# Patient Record
Sex: MALE | Race: WHITE | NOT HISPANIC OR LATINO | Employment: OTHER | URBAN - METROPOLITAN AREA
[De-identification: names, ages, dates, MRNs, and addresses within clinical notes are randomized per-mention and may not be internally consistent; named-entity substitution may affect disease eponyms.]

---

## 2024-12-04 ENCOUNTER — OFFICE VISIT (OUTPATIENT)
Dept: PRIMARY CARE | Facility: CLINIC | Age: 69
End: 2024-12-04
Payer: COMMERCIAL

## 2024-12-04 VITALS
HEIGHT: 67 IN | BODY MASS INDEX: 41.59 KG/M2 | SYSTOLIC BLOOD PRESSURE: 130 MMHG | DIASTOLIC BLOOD PRESSURE: 80 MMHG | WEIGHT: 265 LBS | HEART RATE: 82 BPM | OXYGEN SATURATION: 97 %

## 2024-12-04 DIAGNOSIS — E55.9 VITAMIN D DEFICIENCY: ICD-10-CM

## 2024-12-04 DIAGNOSIS — I10 ESSENTIAL (PRIMARY) HYPERTENSION: ICD-10-CM

## 2024-12-04 DIAGNOSIS — E78.2 HYPERLIPEMIA, MIXED: ICD-10-CM

## 2024-12-04 DIAGNOSIS — E11.9 TYPE 2 DIABETES MELLITUS WITHOUT COMPLICATION, WITHOUT LONG-TERM CURRENT USE OF INSULIN (MULTI): Primary | Chronic | ICD-10-CM

## 2024-12-04 DIAGNOSIS — Z12.5 SCREENING PSA (PROSTATE SPECIFIC ANTIGEN): ICD-10-CM

## 2024-12-04 PROCEDURE — 3008F BODY MASS INDEX DOCD: CPT | Performed by: FAMILY MEDICINE

## 2024-12-04 PROCEDURE — 1126F AMNT PAIN NOTED NONE PRSNT: CPT | Performed by: FAMILY MEDICINE

## 2024-12-04 PROCEDURE — 3075F SYST BP GE 130 - 139MM HG: CPT | Performed by: FAMILY MEDICINE

## 2024-12-04 PROCEDURE — 1123F ACP DISCUSS/DSCN MKR DOCD: CPT | Performed by: FAMILY MEDICINE

## 2024-12-04 PROCEDURE — 3079F DIAST BP 80-89 MM HG: CPT | Performed by: FAMILY MEDICINE

## 2024-12-04 PROCEDURE — 99213 OFFICE O/P EST LOW 20 MIN: CPT | Performed by: FAMILY MEDICINE

## 2024-12-04 PROCEDURE — 1159F MED LIST DOCD IN RCRD: CPT | Performed by: FAMILY MEDICINE

## 2024-12-04 PROCEDURE — 1036F TOBACCO NON-USER: CPT | Performed by: FAMILY MEDICINE

## 2024-12-04 PROCEDURE — 99203 OFFICE O/P NEW LOW 30 MIN: CPT | Performed by: FAMILY MEDICINE

## 2024-12-04 RX ORDER — ASPIRIN 81 MG/1
81 TABLET ORAL
COMMUNITY

## 2024-12-04 RX ORDER — LISINOPRIL AND HYDROCHLOROTHIAZIDE 10; 12.5 MG/1; MG/1
1 TABLET ORAL DAILY
COMMUNITY

## 2024-12-04 RX ORDER — ACETAMINOPHEN 500 MG
TABLET ORAL
COMMUNITY

## 2024-12-04 ASSESSMENT — ENCOUNTER SYMPTOMS
SHORTNESS OF BREATH: 0
PALPITATIONS: 0
LOSS OF SENSATION IN FEET: 0
CHEST TIGHTNESS: 0
POLYDIPSIA: 0
OCCASIONAL FEELINGS OF UNSTEADINESS: 0
DEPRESSION: 0

## 2024-12-04 ASSESSMENT — PATIENT HEALTH QUESTIONNAIRE - PHQ9
2. FEELING DOWN, DEPRESSED OR HOPELESS: NOT AT ALL
1. LITTLE INTEREST OR PLEASURE IN DOING THINGS: NOT AT ALL
SUM OF ALL RESPONSES TO PHQ9 QUESTIONS 1 AND 2: 0

## 2024-12-04 ASSESSMENT — COLUMBIA-SUICIDE SEVERITY RATING SCALE - C-SSRS
1. IN THE PAST MONTH, HAVE YOU WISHED YOU WERE DEAD OR WISHED YOU COULD GO TO SLEEP AND NOT WAKE UP?: NO
2. HAVE YOU ACTUALLY HAD ANY THOUGHTS OF KILLING YOURSELF?: NO
6. HAVE YOU EVER DONE ANYTHING, STARTED TO DO ANYTHING, OR PREPARED TO DO ANYTHING TO END YOUR LIFE?: NO

## 2024-12-04 ASSESSMENT — PAIN SCALES - GENERAL: PAINLEVEL_OUTOF10: 0-NO PAIN

## 2024-12-04 NOTE — PROGRESS NOTES
"[facility]: [facility]  E/M EVALUATION    Chalino Villalta \"Chito\" is a 69 y.o. male who presents for Establish Care.    Subjective   DM -dx 10/23.  No testing since    Htn- controlled.  Does not check routinely.      Mixed hyperlipidemia-  due for labs      Social -no tobacco.      Vitamin d3 deficiency, doesn't take meds all the time.       Review of Systems   Respiratory:  Negative for chest tightness and shortness of breath.    Cardiovascular:  Negative for chest pain, palpitations and leg swelling.   Endocrine: Negative for polydipsia and polyuria.       Objective   Vitals:    12/04/24 1006   BP: 130/80   Pulse: 82   SpO2: 97%     Physical Exam  Constitutional:       Appearance: Normal appearance.   Cardiovascular:      Rate and Rhythm: Normal rate and regular rhythm.      Heart sounds: No murmur heard.  Pulmonary:      Effort: Pulmonary effort is normal.      Breath sounds: Normal breath sounds.   Neurological:      Mental Status: He is alert.       No results found for: \"CHOL\", \"TRIG\", \"HDL\", \"LDLCALC\", \"CHHDL\", \"GLUCOSE\", \"CREATINE\", \"NA\", \"K\", \"ALT\", \"EGFR\", \"INR\", \"HGBA1C\", \"TSH\"    Assessment/Plan      Patient Active Problem List   Diagnosis    Type 2 diabetes mellitus without complications (Multi)    Essential (primary) hypertension       Diagnoses and all orders for this visit:  Type 2 diabetes mellitus without complication, without long-term current use of insulin (Multi)  -     Comprehensive Metabolic Panel; Future  -     Hemoglobin A1C; Future  -     Lipid Panel; Future  -     Albumin-Creatinine Ratio, Urine Random; Future  Essential (primary) hypertension  Screening PSA (prostate specific antigen)  -     Prostate Specific Antigen, Screen; Future  Hyperlipemia, mixed  Vitamin D deficiency  -     Vitamin D 25-Hydroxy,Total (for eval of Vitamin D levels); Future      The patient was encouraged to ensure that any/all documentation is accurate and up to date, and that our office be " provided a copy in the event that anything changes.         Bakari Cochran MD

## 2024-12-06 ENCOUNTER — LAB (OUTPATIENT)
Dept: LAB | Facility: LAB | Age: 69
End: 2024-12-06
Payer: COMMERCIAL

## 2024-12-06 DIAGNOSIS — E55.9 VITAMIN D DEFICIENCY: ICD-10-CM

## 2024-12-06 DIAGNOSIS — Z12.5 SCREENING PSA (PROSTATE SPECIFIC ANTIGEN): ICD-10-CM

## 2024-12-06 DIAGNOSIS — E11.9 TYPE 2 DIABETES MELLITUS WITHOUT COMPLICATION, WITHOUT LONG-TERM CURRENT USE OF INSULIN (MULTI): Chronic | ICD-10-CM

## 2024-12-06 LAB
EST. AVERAGE GLUCOSE BLD GHB EST-MCNC: 143 MG/DL
HBA1C MFR BLD: 6.6 %

## 2024-12-06 PROCEDURE — 36415 COLL VENOUS BLD VENIPUNCTURE: CPT

## 2024-12-06 PROCEDURE — G0103 PSA SCREENING: HCPCS

## 2024-12-06 PROCEDURE — 83036 HEMOGLOBIN GLYCOSYLATED A1C: CPT

## 2024-12-06 PROCEDURE — 82306 VITAMIN D 25 HYDROXY: CPT

## 2024-12-06 PROCEDURE — 80053 COMPREHEN METABOLIC PANEL: CPT

## 2024-12-06 PROCEDURE — 80061 LIPID PANEL: CPT

## 2024-12-07 LAB
25(OH)D3 SERPL-MCNC: 39 NG/ML (ref 30–100)
ALBUMIN SERPL BCP-MCNC: 3.9 G/DL (ref 3.4–5)
ALP SERPL-CCNC: 44 U/L (ref 33–136)
ALT SERPL W P-5'-P-CCNC: 40 U/L (ref 10–52)
ANION GAP SERPL CALC-SCNC: 12 MMOL/L (ref 10–20)
AST SERPL W P-5'-P-CCNC: 35 U/L (ref 9–39)
BILIRUB SERPL-MCNC: 0.6 MG/DL (ref 0–1.2)
BUN SERPL-MCNC: 16 MG/DL (ref 6–23)
CALCIUM SERPL-MCNC: 9 MG/DL (ref 8.6–10.6)
CHLORIDE SERPL-SCNC: 103 MMOL/L (ref 98–107)
CHOLEST SERPL-MCNC: 206 MG/DL (ref 0–199)
CHOLESTEROL/HDL RATIO: 3
CO2 SERPL-SCNC: 29 MMOL/L (ref 21–32)
CREAT SERPL-MCNC: 0.99 MG/DL (ref 0.5–1.3)
EGFRCR SERPLBLD CKD-EPI 2021: 83 ML/MIN/1.73M*2
GLUCOSE SERPL-MCNC: 140 MG/DL (ref 74–99)
HDLC SERPL-MCNC: 69.6 MG/DL
LDLC SERPL CALC-MCNC: 118 MG/DL
NON HDL CHOLESTEROL: 136 MG/DL (ref 0–149)
POTASSIUM SERPL-SCNC: 4.5 MMOL/L (ref 3.5–5.3)
PROT SERPL-MCNC: 6.6 G/DL (ref 6.4–8.2)
PSA SERPL-MCNC: 1.21 NG/ML
SODIUM SERPL-SCNC: 139 MMOL/L (ref 136–145)
TRIGL SERPL-MCNC: 94 MG/DL (ref 0–149)
VLDL: 19 MG/DL (ref 0–40)

## 2024-12-09 DIAGNOSIS — E11.9 TYPE 2 DIABETES MELLITUS WITHOUT COMPLICATION, WITHOUT LONG-TERM CURRENT USE OF INSULIN (MULTI): Primary | ICD-10-CM

## 2024-12-09 RX ORDER — ROSUVASTATIN CALCIUM 5 MG/1
5 TABLET, COATED ORAL DAILY
Qty: 100 TABLET | Refills: 3 | Status: SHIPPED | OUTPATIENT
Start: 2024-12-09 | End: 2026-01-13

## 2025-01-08 DIAGNOSIS — I10 ESSENTIAL (PRIMARY) HYPERTENSION: Primary | ICD-10-CM

## 2025-01-09 RX ORDER — LISINOPRIL AND HYDROCHLOROTHIAZIDE 10; 12.5 MG/1; MG/1
1 TABLET ORAL DAILY
Qty: 30 TABLET | Refills: 0 | Status: SHIPPED | OUTPATIENT
Start: 2025-01-09 | End: 2025-01-10 | Stop reason: SDUPTHER

## 2025-01-10 DIAGNOSIS — I10 ESSENTIAL (PRIMARY) HYPERTENSION: ICD-10-CM

## 2025-01-10 RX ORDER — LISINOPRIL AND HYDROCHLOROTHIAZIDE 10; 12.5 MG/1; MG/1
1 TABLET ORAL DAILY
Qty: 30 TABLET | Refills: 0 | Status: SHIPPED | OUTPATIENT
Start: 2025-01-10 | End: 2025-02-09

## 2025-01-10 RX ORDER — LISINOPRIL AND HYDROCHLOROTHIAZIDE 10; 12.5 MG/1; MG/1
1 TABLET ORAL DAILY
Qty: 30 TABLET | Refills: 0 | OUTPATIENT
Start: 2025-01-10 | End: 2025-02-09

## 2025-01-10 NOTE — TELEPHONE ENCOUNTER
Pt requesting to resend medication to [facility] instead - updated in chart - previous pharmacy was being remodeled

## 2025-01-31 DIAGNOSIS — I10 ESSENTIAL (PRIMARY) HYPERTENSION: ICD-10-CM

## 2025-02-04 RX ORDER — LISINOPRIL AND HYDROCHLOROTHIAZIDE 10; 12.5 MG/1; MG/1
1 TABLET ORAL DAILY
Qty: 30 TABLET | Refills: 0 | Status: SHIPPED | OUTPATIENT
Start: 2025-02-04 | End: 2025-03-06

## 2025-03-03 DIAGNOSIS — I10 ESSENTIAL (PRIMARY) HYPERTENSION: ICD-10-CM

## 2025-03-04 RX ORDER — LISINOPRIL AND HYDROCHLOROTHIAZIDE 10; 12.5 MG/1; MG/1
1 TABLET ORAL DAILY
Qty: 90 TABLET | Refills: 0 | Status: SHIPPED | OUTPATIENT
Start: 2025-03-04 | End: 2025-06-02

## 2025-03-05 NOTE — TELEPHONE ENCOUNTER
Spoke with pt he will look at his calendar and go online and schedule an appointment to establish care.

## 2025-03-17 PROBLEM — E78.00 PURE HYPERCHOLESTEROLEMIA, UNSPECIFIED: Status: ACTIVE | Noted: 2021-08-23

## 2025-03-17 PROBLEM — E55.9 VITAMIN D DEFICIENCY: Status: ACTIVE | Noted: 2021-08-23

## 2025-03-17 PROBLEM — K64.9 UNSPECIFIED HEMORRHOIDS: Status: ACTIVE | Noted: 2021-08-23

## 2025-03-17 PROBLEM — S61.011A LACERATION OF RIGHT THUMB, INITIAL ENCOUNTER: Status: ACTIVE | Noted: 2025-03-17

## 2025-03-17 PROBLEM — E66.9 OBESITY (BMI 30-39.9): Status: ACTIVE | Noted: 2021-10-12

## 2025-03-17 PROBLEM — M54.12 CERVICAL RADICULOPATHY: Status: ACTIVE | Noted: 2021-10-12

## 2025-03-18 ENCOUNTER — OFFICE VISIT (OUTPATIENT)
Dept: PRIMARY CARE | Facility: CLINIC | Age: 70
End: 2025-03-18
Payer: MEDICARE

## 2025-03-18 VITALS
OXYGEN SATURATION: 96 % | HEART RATE: 87 BPM | BODY MASS INDEX: 42.22 KG/M2 | HEIGHT: 67 IN | WEIGHT: 269 LBS | DIASTOLIC BLOOD PRESSURE: 76 MMHG | SYSTOLIC BLOOD PRESSURE: 136 MMHG

## 2025-03-18 DIAGNOSIS — I10 ESSENTIAL (PRIMARY) HYPERTENSION: ICD-10-CM

## 2025-03-18 DIAGNOSIS — E11.9 TYPE 2 DIABETES MELLITUS WITHOUT COMPLICATION, WITHOUT LONG-TERM CURRENT USE OF INSULIN (MULTI): Chronic | ICD-10-CM

## 2025-03-18 DIAGNOSIS — R06.83 SNORING: ICD-10-CM

## 2025-03-18 DIAGNOSIS — Z00.00 MEDICARE ANNUAL WELLNESS VISIT, SUBSEQUENT: ICD-10-CM

## 2025-03-18 DIAGNOSIS — E78.2 MIXED HYPERLIPIDEMIA: Primary | ICD-10-CM

## 2025-03-18 DIAGNOSIS — Z00.00 WELLNESS EXAMINATION: ICD-10-CM

## 2025-03-18 DIAGNOSIS — E66.01 CLASS 3 SEVERE OBESITY DUE TO EXCESS CALORIES WITH SERIOUS COMORBIDITY AND BODY MASS INDEX (BMI) OF 40.0 TO 44.9 IN ADULT: ICD-10-CM

## 2025-03-18 DIAGNOSIS — Z13.6 SCREENING FOR AAA (ABDOMINAL AORTIC ANEURYSM): ICD-10-CM

## 2025-03-18 DIAGNOSIS — E66.813 CLASS 3 SEVERE OBESITY DUE TO EXCESS CALORIES WITH SERIOUS COMORBIDITY AND BODY MASS INDEX (BMI) OF 40.0 TO 44.9 IN ADULT: ICD-10-CM

## 2025-03-18 PROCEDURE — 99397 PER PM REEVAL EST PAT 65+ YR: CPT | Performed by: FAMILY MEDICINE

## 2025-03-18 PROCEDURE — G0446 INTENS BEHAVE THER CARDIO DX: HCPCS | Performed by: FAMILY MEDICINE

## 2025-03-18 PROCEDURE — G0444 DEPRESSION SCREEN ANNUAL: HCPCS | Performed by: FAMILY MEDICINE

## 2025-03-18 PROCEDURE — 3075F SYST BP GE 130 - 139MM HG: CPT | Performed by: FAMILY MEDICINE

## 2025-03-18 PROCEDURE — 3078F DIAST BP <80 MM HG: CPT | Performed by: FAMILY MEDICINE

## 2025-03-18 PROCEDURE — 1126F AMNT PAIN NOTED NONE PRSNT: CPT | Performed by: FAMILY MEDICINE

## 2025-03-18 PROCEDURE — G2211 COMPLEX E/M VISIT ADD ON: HCPCS | Performed by: FAMILY MEDICINE

## 2025-03-18 PROCEDURE — G0439 PPPS, SUBSEQ VISIT: HCPCS | Performed by: FAMILY MEDICINE

## 2025-03-18 PROCEDURE — 99397 PER PM REEVAL EST PAT 65+ YR: CPT | Mod: CSA | Performed by: FAMILY MEDICINE

## 2025-03-18 PROCEDURE — 3008F BODY MASS INDEX DOCD: CPT | Performed by: FAMILY MEDICINE

## 2025-03-18 PROCEDURE — 99214 OFFICE O/P EST MOD 30 MIN: CPT | Performed by: FAMILY MEDICINE

## 2025-03-18 PROCEDURE — 1159F MED LIST DOCD IN RCRD: CPT | Performed by: FAMILY MEDICINE

## 2025-03-18 PROCEDURE — 1170F FXNL STATUS ASSESSED: CPT | Performed by: FAMILY MEDICINE

## 2025-03-18 PROCEDURE — G0447 BEHAVIOR COUNSEL OBESITY 15M: HCPCS | Performed by: FAMILY MEDICINE

## 2025-03-18 PROCEDURE — 99215 OFFICE O/P EST HI 40 MIN: CPT | Performed by: FAMILY MEDICINE

## 2025-03-18 PROCEDURE — 99214 OFFICE O/P EST MOD 30 MIN: CPT | Mod: 25 | Performed by: FAMILY MEDICINE

## 2025-03-18 PROCEDURE — 1124F ACP DISCUSS-NO DSCNMKR DOCD: CPT | Performed by: FAMILY MEDICINE

## 2025-03-18 PROCEDURE — 1036F TOBACCO NON-USER: CPT | Performed by: FAMILY MEDICINE

## 2025-03-18 RX ORDER — LISINOPRIL AND HYDROCHLOROTHIAZIDE 10; 12.5 MG/1; MG/1
1 TABLET ORAL DAILY
Qty: 90 TABLET | Refills: 0 | Status: SHIPPED | OUTPATIENT
Start: 2025-03-18 | End: 2025-06-16

## 2025-03-18 RX ORDER — ASPIRIN 81 MG/1
81 TABLET ORAL DAILY
Qty: 90 TABLET | Refills: 1 | Status: SHIPPED | OUTPATIENT
Start: 2025-03-18 | End: 2025-09-14

## 2025-03-18 ASSESSMENT — PATIENT HEALTH QUESTIONNAIRE - PHQ9
SUM OF ALL RESPONSES TO PHQ9 QUESTIONS 1 AND 2: 0
SUM OF ALL RESPONSES TO PHQ9 QUESTIONS 1 AND 2: 0
1. LITTLE INTEREST OR PLEASURE IN DOING THINGS: NOT AT ALL
2. FEELING DOWN, DEPRESSED OR HOPELESS: NOT AT ALL
2. FEELING DOWN, DEPRESSED OR HOPELESS: NOT AT ALL
1. LITTLE INTEREST OR PLEASURE IN DOING THINGS: NOT AT ALL

## 2025-03-18 ASSESSMENT — ACTIVITIES OF DAILY LIVING (ADL)
MANAGING_FINANCES: INDEPENDENT
DRESSING: INDEPENDENT
GROCERY_SHOPPING: INDEPENDENT
BATHING: INDEPENDENT
TAKING_MEDICATION: INDEPENDENT
DOING_HOUSEWORK: INDEPENDENT

## 2025-03-18 ASSESSMENT — ENCOUNTER SYMPTOMS
OCCASIONAL FEELINGS OF UNSTEADINESS: 0
LOSS OF SENSATION IN FEET: 0
DEPRESSION: 0

## 2025-03-18 ASSESSMENT — COGNITIVE AND FUNCTIONAL STATUS - GENERAL: VERBAL FLUENCY - ANIMAL NAMES (0 TO 25): 3

## 2025-03-18 ASSESSMENT — COLUMBIA-SUICIDE SEVERITY RATING SCALE - C-SSRS
6. HAVE YOU EVER DONE ANYTHING, STARTED TO DO ANYTHING, OR PREPARED TO DO ANYTHING TO END YOUR LIFE?: NO
1. IN THE PAST MONTH, HAVE YOU WISHED YOU WERE DEAD OR WISHED YOU COULD GO TO SLEEP AND NOT WAKE UP?: NO
2. HAVE YOU ACTUALLY HAD ANY THOUGHTS OF KILLING YOURSELF?: NO

## 2025-03-18 ASSESSMENT — PAIN SCALES - GENERAL: PAINLEVEL_OUTOF10: 0-NO PAIN

## 2025-03-18 NOTE — PROGRESS NOTES
69-year-old resents to clinic to establish care for Medicare annual wellness visit yearly physical and follow chronic medical conditions    Health Maintenance:  Eats a standard American diet.  Gets minimal exercise.  Does not drink, smoke, use illicit substances.  Due for AAA screening and Otherwise up-to-date on all routine health maintenance screenings.  Due for immunizations.  Due for labs    1. Mixed hyperlipidemia    2. Screening for AAA (abdominal aortic aneurysm)    3. Essential (primary) hypertension   Blood pressure 136/76 currently on lisinopril hydrochlorothiazide no chest pain or shortness of breath   4. Type 2 diabetes mellitus without complication, without long-term current use of insulin (Multi)   Not currently on medication last A1c 6.6 needs rechecked.  Tries eat a lower carbohydrate diet but is never tried ketogenic   5. Class 3 severe obesity due to excess calories with serious comorbidity and body mass index (BMI) of 40.0 to 44.9 in adult      6. Snoring   Snores, difficulty losing weight, on multiple blood pressure medications and never had sleep study         All pertinent positive symptoms are included in history of present illness.    All other systems have been reviewed and are negative and noncontributory to this patient's current ailments.    CONSTITUTIONAL - INAD. Not ill appearing.  SKIN - No lesions or rashes visualized. Good skin turgor.  HEENT- Head is atraumatic and normocephalic. Nasal turbinates are nonerythematous and without drainage. .  RESP - CTAB. No wheezing, rhonchi, or crackles.   CARDIAC - RRR. No murmurs, gallops, or rubs.  ABDOMEN - Soft, nontender, nondistended. No organomegaly.  NEURO - CNs 2-12 grossly intact.  PSYCH - Normal mood and affect      1. Screening for AAA (abdominal aortic aneurysm)    - Vascular US Abdominal Aorta Aneurysm AAA Screening; Future    2. Mixed hyperlipidemia (Primary)  Discussed with patient about the natural history and course of  hyperlipidemia.  Discussed lifestyle as well as genetic implications of hyperlipidemia.      Discussed possible treatment options of hyperlipidemia including intensive lifestyle interventions including lower carbohydrate, lower saturated fat diet as well as increasing aerobic and resistance training exercise to at least 30 minutes daily 3 times a week, hopefully more.    Discussed medication options including the use of statin medications as well as the side effects of these medications.  Discussed goal LDL of <100 for primary prevention and <70 for secondary prevention.    Discussed the cost benefit analysis of lowering cholesterol and how it will help prevent heart attacks and strokes in the future, also discussed the benefit of statin medications and the large amount of studies showing a reduction in morbidity mortality with the use of a statin medication in the setting of multiple risk factors for heart attacks and strokes.    Discussed secondary risk stratification with CT Cardiac Scoring and utilizing the PREVENT calculator to determine if statin use with actually benefit patients.     If medication was prescribed or continued, the appropriate lab work was ordered.  - aspirin 81 mg EC tablet; Take 1 tablet (81 mg) by mouth once daily.  Dispense: 90 tablet; Refill: 1  - CT cardiac scoring wo IV contrast; Future  - Comprehensive metabolic panel; Future  - Lipid panel; Future  - Comprehensive metabolic panel  - Lipid panel    3. Essential (primary) hypertension  Had a discussion with the patient about hypertension.  Discussed the natural history and course of hypertension and need for controlling blood pressure.  Discussed the cost benefit of treating hypertension with medication and how lowering blood pressure to goal levels will help reduce the risk of heart attacks and strokes in the future.     Discussed lifestyle interventions including regular cardio aerobic exercise 30 minutes daily at least 3 times a  week, hopefully more.  Discussed dietary interventions to help lower blood pressure.    Recommend patient get a blood pressure cuff and begin measuring blood pressure at home and keeping a log.  Advised the patient bring the log with them at their next visit so that we can find out the average blood pressure reading and determine whether or not the treatment is working.    If patient is already taking medication, I recommended continuing or changing medication depending on blood pressure control.    Appropriate lab work was ordered.  - lisinopriL-hydrochlorothiazide 10-12.5 mg tablet; Take 1 tablet by mouth once daily.  Dispense: 90 tablet; Refill: 0    4. Type 2 diabetes mellitus without complication, without long-term current use of insulin (Multi)  Spoke extensively about the natural course and history of type 2 diabetes.    Spoke about how glucose in the blood causes blood vessel wall damage and can lead to end-organ damage and how it can damage the blood vessels that supply the nerves causing polyneuropathy, that supply the kidneys causing kidney failure, that supply the heart causing heart attacks, and that supply the brain causing strokes.  Spoke about the need to lower blood sugar is much as possible by interventions such as limiting carbohydrate intake, exercising, as well as using medications to remove glucose from the blood.    Discussion was had about the role of medications as well as lifestyle interventions and, my personal recommendation is an intensive lifestyle intervention including dietary and exercise programs to help reduce the need for medications in the future.  My personal recommendation for all diabetics is to undertake a very low carbohydrate diet such as a ketogenic diet and this may have been discussed with the patient if appropriate.    Conversation may have included information about medications such as metformin, SGLT2 inhibitors, DPP 4 inhibitors, GLP-1 agonitsts, sulfonylureas, and  insulin, the side effects and expected benefits of each recommended medication, and the recommended monitoring for each.    Recommendations made to the patient included optimal medical therapy including recommending an ACE/ARB for renal protection as well as statin therapy for prevention of heart attacks and strokes.  Blood pressure goal of 130/80.  Recommend patient follow-up every 3 months for A1c checks and appropriate examinations as well as treatment protocol adjustments.   Appropriate lab work was ordered today.  - Hemoglobin A1c; Future  - Hemoglobin A1c    5. Class 3 severe obesity due to excess calories with serious comorbidity and body mass index (BMI) of 40.0 to 44.9 in adult  Spoke about the natural history and course of obesity as well as the outcomes that can be expected secondary to this condition including effects on life expectancy, musculoskeletal pain, cardiovascular pathology, sleep apnea, and various other conditions associated with obesity.  My first recommendation is always an integrated approach to lifestyle interventions including a strict dietary regimen, whether that be plant-based diets, ketogenic diets, protein sparing modified fasts, or calorie restriction, as well as an integrated exercise program including a recommendation to perform moderate to high intensity exercise 30 to 45 minutes every single day.  Exercise should include a mix of cardiovascular exercise and resistance training.  Other options possibly discussed included medication management including medications such as semaglutide, stimulants, and nontraditional medications such as bupropion/naltrexone.  If discussed, cost-benefit analysis of each was discussed with the patient.  I also possibly discussed therecommendation of bariatric surgery if indicated and the cost/benefit of this procedure.  Patient made an educated decision on one of these interventions at this time.    - A ketogenic diet is a diet that is restricted in  "carbohydrate intake, moderate in protein, and high in fat.   - Studies have shown that this is an effective means of weight loss as well as maintaining stable blood sugar levels, and is now a recommended treatment for diabetes according to the American Diabetic Association.  - Limit your net carbohydrate intake to 20 g per day, most of which will come from green vegetables. The majority of your food calories should come from things like meats, cheeses, nuts, green, leafy vegetables, and limited fruit as a treat.   - Avoid foods such as breads, pastas, rice, potatoes, and other starches. Also avoid any foods with sugar in them, including desserts. Many drinks are very high in sugar as well, including milk, juices, and sodas.  - You may struggle with counting carbohydrates initially, using apps like Keto, [employer] or [employer] (MM) can assist you with learning how to count carbs and which foods are lowest in carbohydrates.  - The first few days to a week after starting this diet, you may notice symptoms such as fatigue, dizziness, or lightheadedness, also known as the \"Keto Flu.\" This is due to the natural diuretic effect and water/electrolytes loss. To offset this, increase your salt intake by adding salt to food or drinking sugar-free versions of electrolyte drinks like Gatorade or Powerade.  - A packet of information about this diet was given to the patient and discussed in detail.  - Considerable time greater than 20 minutes was spent in nutrition counseling during this visit.          6. Snoring  Spoke about the natural history and course of sleep apnea.       Spoke about the signs and symptoms of sleep apnea including chronic fatigue, excessive daytime sleepiness, snoring, the appearance of stopping breathing while sleeping, morning headaches, and the need for frequent napping.    Spoke about the evaluation and treatment of sleep apnea including the need for either home sleep study or evaluation had a sleep center " with a polysomnogram.  Spoke about treatment options including lifestyle interventions, weight loss, positional therapy, CPAP machine/AutoPap machine, surgical correction, as well as implantable devices like the inspire device.    Spoke about the possible long-term side effects of untreated sleep apnea with the patient including the increased risk for cardiovascular disease, lung disease, difficulty losing weight, high fasting sugars and diabetes, and risk for metabolic syndrome.    We will start with a home sleep study or polysomnogram and proceed with treatment from there if necessary.  - Home sleep apnea test (HSAT); Future    7. Medicare annual wellness visit, subsequent  Obesity discussion was had. 5 minutes was spent in discussion. , Cardiovascular disease discussion was had including discussions of chronic medical conditions such as hypertension, hyperlipidemia, CAD, or others. 15 minutes was spent in discussion. , and Depression screening was completed. 5 minutes was spent in this discussion.      8. Wellness examination  Health and wellness topics discussed today.  Recommended eating a varied and healthy diet and made dietary recommendations, also discussed exercise and exercising regularly 30 minutes a day 3 days a week.  Immunizations recommended and updated.  Health screening guidelines discussed with patient including possible things such as colonoscopies, mammograms, prostate screenings, lung cancer screenings, bone densitometry, and other wellness topics.  Yearly lab work ordered today.

## 2025-03-25 DIAGNOSIS — E11.9 TYPE 2 DIABETES MELLITUS WITHOUT COMPLICATION, WITHOUT LONG-TERM CURRENT USE OF INSULIN: ICD-10-CM

## 2025-03-25 LAB
ALBUMIN SERPL-MCNC: 4.1 G/DL (ref 3.6–5.1)
ALP SERPL-CCNC: 47 U/L (ref 35–144)
ALT SERPL-CCNC: 38 U/L (ref 9–46)
ANION GAP SERPL CALCULATED.4IONS-SCNC: 11 MMOL/L (CALC) (ref 7–17)
AST SERPL-CCNC: 30 U/L (ref 10–35)
BILIRUB SERPL-MCNC: 0.4 MG/DL (ref 0.2–1.2)
BUN SERPL-MCNC: 21 MG/DL (ref 7–25)
CALCIUM SERPL-MCNC: 9.2 MG/DL (ref 8.6–10.3)
CHLORIDE SERPL-SCNC: 102 MMOL/L (ref 98–110)
CHOLEST SERPL-MCNC: 145 MG/DL
CHOLEST/HDLC SERPL: 2.4 (CALC)
CO2 SERPL-SCNC: 26 MMOL/L (ref 20–32)
CREAT SERPL-MCNC: 1.09 MG/DL (ref 0.7–1.35)
EGFRCR SERPLBLD CKD-EPI 2021: 74 ML/MIN/1.73M2
GLUCOSE SERPL-MCNC: 118 MG/DL (ref 65–139)
HDLC SERPL-MCNC: 61 MG/DL
LDLC SERPL CALC-MCNC: 65 MG/DL (CALC)
NONHDLC SERPL-MCNC: 84 MG/DL (CALC)
POTASSIUM SERPL-SCNC: 4.3 MMOL/L (ref 3.5–5.3)
PROT SERPL-MCNC: 6.8 G/DL (ref 6.1–8.1)
SODIUM SERPL-SCNC: 139 MMOL/L (ref 135–146)
TRIGL SERPL-MCNC: 105 MG/DL

## 2025-03-25 RX ORDER — ROSUVASTATIN CALCIUM 5 MG/1
5 TABLET, COATED ORAL DAILY
Qty: 100 TABLET | Refills: 3 | Status: SHIPPED | OUTPATIENT
Start: 2025-03-25 | End: 2026-04-29

## 2025-03-26 LAB
EST. AVERAGE GLUCOSE BLD GHB EST-MCNC: 160 MG/DL
EST. AVERAGE GLUCOSE BLD GHB EST-SCNC: 8.9 MMOL/L
HBA1C MFR BLD: 7.2 % OF TOTAL HGB

## 2025-04-01 DIAGNOSIS — E11.9 TYPE 2 DIABETES MELLITUS WITHOUT COMPLICATION, WITHOUT LONG-TERM CURRENT USE OF INSULIN: ICD-10-CM

## 2025-04-01 RX ORDER — ROSUVASTATIN CALCIUM 5 MG/1
5 TABLET, COATED ORAL DAILY
Qty: 90 TABLET | Refills: 0 | Status: SHIPPED | OUTPATIENT
Start: 2025-04-01 | End: 2025-06-30

## 2025-04-08 ENCOUNTER — APPOINTMENT (OUTPATIENT)
Dept: PRIMARY CARE | Facility: CLINIC | Age: 70
End: 2025-04-08
Payer: MEDICARE

## 2025-05-30 DIAGNOSIS — I10 ESSENTIAL (PRIMARY) HYPERTENSION: ICD-10-CM

## 2025-05-30 RX ORDER — LISINOPRIL AND HYDROCHLOROTHIAZIDE 10; 12.5 MG/1; MG/1
1 TABLET ORAL DAILY
Qty: 90 TABLET | Refills: 0 | Status: SHIPPED | OUTPATIENT
Start: 2025-05-30 | End: 2025-08-28

## 2025-06-17 ENCOUNTER — OFFICE VISIT (OUTPATIENT)
Dept: PRIMARY CARE | Facility: CLINIC | Age: 70
End: 2025-06-17
Payer: MEDICARE

## 2025-06-17 VITALS
HEIGHT: 67 IN | WEIGHT: 258 LBS | SYSTOLIC BLOOD PRESSURE: 132 MMHG | BODY MASS INDEX: 40.49 KG/M2 | HEART RATE: 103 BPM | OXYGEN SATURATION: 96 % | DIASTOLIC BLOOD PRESSURE: 82 MMHG

## 2025-06-17 DIAGNOSIS — E78.2 MIXED HYPERLIPIDEMIA: ICD-10-CM

## 2025-06-17 DIAGNOSIS — E11.9 TYPE 2 DIABETES MELLITUS WITHOUT COMPLICATION, WITHOUT LONG-TERM CURRENT USE OF INSULIN: ICD-10-CM

## 2025-06-17 DIAGNOSIS — I10 ESSENTIAL (PRIMARY) HYPERTENSION: ICD-10-CM

## 2025-06-17 LAB — POC HEMOGLOBIN A1C: 5.6 % (ref 4.2–6.5)

## 2025-06-17 PROCEDURE — 1159F MED LIST DOCD IN RCRD: CPT | Performed by: FAMILY MEDICINE

## 2025-06-17 PROCEDURE — 3075F SYST BP GE 130 - 139MM HG: CPT | Performed by: FAMILY MEDICINE

## 2025-06-17 PROCEDURE — 99214 OFFICE O/P EST MOD 30 MIN: CPT | Performed by: FAMILY MEDICINE

## 2025-06-17 PROCEDURE — G2211 COMPLEX E/M VISIT ADD ON: HCPCS | Performed by: FAMILY MEDICINE

## 2025-06-17 PROCEDURE — 3079F DIAST BP 80-89 MM HG: CPT | Performed by: FAMILY MEDICINE

## 2025-06-17 PROCEDURE — 3044F HG A1C LEVEL LT 7.0%: CPT | Performed by: FAMILY MEDICINE

## 2025-06-17 PROCEDURE — 1036F TOBACCO NON-USER: CPT | Performed by: FAMILY MEDICINE

## 2025-06-17 PROCEDURE — 83036 HEMOGLOBIN GLYCOSYLATED A1C: CPT | Performed by: FAMILY MEDICINE

## 2025-06-17 PROCEDURE — 3008F BODY MASS INDEX DOCD: CPT | Performed by: FAMILY MEDICINE

## 2025-06-17 PROCEDURE — 1126F AMNT PAIN NOTED NONE PRSNT: CPT | Performed by: FAMILY MEDICINE

## 2025-06-17 PROCEDURE — 1158F ADVNC CARE PLAN TLK DOCD: CPT | Performed by: FAMILY MEDICINE

## 2025-06-17 PROCEDURE — 1123F ACP DISCUSS/DSCN MKR DOCD: CPT | Performed by: FAMILY MEDICINE

## 2025-06-17 RX ORDER — ROSUVASTATIN CALCIUM 5 MG/1
5 TABLET, COATED ORAL DAILY
Qty: 90 TABLET | Refills: 3 | Status: SHIPPED | OUTPATIENT
Start: 2025-06-17 | End: 2026-06-17

## 2025-06-17 RX ORDER — LISINOPRIL AND HYDROCHLOROTHIAZIDE 10; 12.5 MG/1; MG/1
1 TABLET ORAL DAILY
Qty: 90 TABLET | Refills: 3 | Status: SHIPPED | OUTPATIENT
Start: 2025-06-17 | End: 2026-06-17

## 2025-06-17 RX ORDER — ASPIRIN 81 MG/1
81 TABLET ORAL DAILY
Qty: 90 TABLET | Refills: 1 | Status: SHIPPED | OUTPATIENT
Start: 2025-06-17 | End: 2025-12-14

## 2025-06-17 ASSESSMENT — PAIN SCALES - GENERAL: PAINLEVEL_OUTOF10: 0-NO PAIN

## 2025-06-17 NOTE — PROGRESS NOTES
69-year old presents to clinic for 3-month follow-up      Type 2 diabetes:  Last A1c of 7.2.  Patient elected to perform a ketogenic diet.  In office A1c today is 5.6 and patient has lost around 15-20 pounds per his home scale.  Tolerating a diet well      Hyperlipidemia: On 5 mg Crestor tolerating well    Hypertension: Blood pressure 132/82    All pertinent positive symptoms are included in history of present illness.    All other systems have been reviewed and are negative and noncontributory to this patient's current ailments.      CONSTITUTIONAL - INAD. Not ill appearing.  SKIN - No lesions or rashes visualized. No jaundice visualized.  HEENT- Atraumatic, normocephalic, no scleral icterus, external nares are not erythematous and without drainage, no neck masses visualized, oropharynx visualized and is without erythema or exudate  RESP - respiration not labored   CARDIAC - no grade 6 systolic murmurs auscultated  ABDOMEN - nondistended.  NEURO- CNs II-XII grossly intact          1. Mixed hyperlipidemia  LDL at goal continue Crestor  - aspirin 81 mg EC tablet; Take 1 tablet (81 mg) by mouth once daily.  Dispense: 90 tablet; Refill: 1    2. Essential (primary) hypertension  Well-controlled continue medication  - lisinopriL-hydrochlorothiazide 10-12.5 mg tablet; Take 1 tablet by mouth once daily.  Dispense: 90 tablet; Refill: 3    3. Type 2 diabetes mellitus without complication, without long-term current use of insulin  Excellent response to dietary intervention.  Continue ketogenic diet.  - rosuvastatin (Crestor) 5 mg tablet; Take 1 tablet (5 mg) by mouth once daily.  Dispense: 90 tablet; Refill: 3  - POCT glycosylated hemoglobin (Hb A1C) manually resulted      
No